# Patient Record
Sex: FEMALE | Race: WHITE | Employment: UNEMPLOYED | URBAN - METROPOLITAN AREA
[De-identification: names, ages, dates, MRNs, and addresses within clinical notes are randomized per-mention and may not be internally consistent; named-entity substitution may affect disease eponyms.]

---

## 2022-09-29 ENCOUNTER — OFFICE VISIT (OUTPATIENT)
Dept: ORTHOPEDIC SURGERY | Age: 67
End: 2022-09-29
Payer: MEDICARE

## 2022-09-29 VITALS
OXYGEN SATURATION: 96 % | WEIGHT: 187 LBS | SYSTOLIC BLOOD PRESSURE: 117 MMHG | TEMPERATURE: 97.2 F | DIASTOLIC BLOOD PRESSURE: 63 MMHG | HEART RATE: 94 BPM

## 2022-09-29 DIAGNOSIS — M43.16 SPONDYLOLISTHESIS AT L4-L5 LEVEL: ICD-10-CM

## 2022-09-29 DIAGNOSIS — M84.48XA SACRAL INSUFFICIENCY FRACTURE, INITIAL ENCOUNTER: Primary | ICD-10-CM

## 2022-09-29 DIAGNOSIS — M43.16 SPONDYLOLISTHESIS AT L4-L5 LEVEL: Primary | ICD-10-CM

## 2022-09-29 DIAGNOSIS — G95.19 NEUROGENIC CLAUDICATION (HCC): ICD-10-CM

## 2022-09-29 PROCEDURE — G8427 DOCREV CUR MEDS BY ELIG CLIN: HCPCS | Performed by: PHYSICAL MEDICINE & REHABILITATION

## 2022-09-29 PROCEDURE — G8432 DEP SCR NOT DOC, RNG: HCPCS | Performed by: PHYSICAL MEDICINE & REHABILITATION

## 2022-09-29 PROCEDURE — 96372 THER/PROPH/DIAG INJ SC/IM: CPT | Performed by: PHYSICAL MEDICINE & REHABILITATION

## 2022-09-29 PROCEDURE — 99204 OFFICE O/P NEW MOD 45 MIN: CPT | Performed by: PHYSICAL MEDICINE & REHABILITATION

## 2022-09-29 PROCEDURE — 1101F PT FALLS ASSESS-DOCD LE1/YR: CPT | Performed by: PHYSICAL MEDICINE & REHABILITATION

## 2022-09-29 PROCEDURE — 1123F ACP DISCUSS/DSCN MKR DOCD: CPT | Performed by: PHYSICAL MEDICINE & REHABILITATION

## 2022-09-29 PROCEDURE — G8421 BMI NOT CALCULATED: HCPCS | Performed by: PHYSICAL MEDICINE & REHABILITATION

## 2022-09-29 PROCEDURE — 72110 X-RAY EXAM L-2 SPINE 4/>VWS: CPT | Performed by: PHYSICAL MEDICINE & REHABILITATION

## 2022-09-29 PROCEDURE — G8400 PT W/DXA NO RESULTS DOC: HCPCS | Performed by: PHYSICAL MEDICINE & REHABILITATION

## 2022-09-29 PROCEDURE — 1090F PRES/ABSN URINE INCON ASSESS: CPT | Performed by: PHYSICAL MEDICINE & REHABILITATION

## 2022-09-29 PROCEDURE — 3017F COLORECTAL CA SCREEN DOC REV: CPT | Performed by: PHYSICAL MEDICINE & REHABILITATION

## 2022-09-29 PROCEDURE — G8536 NO DOC ELDER MAL SCRN: HCPCS | Performed by: PHYSICAL MEDICINE & REHABILITATION

## 2022-09-29 RX ORDER — KETOROLAC TROMETHAMINE 30 MG/ML
30 INJECTION, SOLUTION INTRAMUSCULAR; INTRAVENOUS ONCE
Status: COMPLETED | OUTPATIENT
Start: 2022-09-29 | End: 2022-09-29

## 2022-09-29 RX ORDER — TIZANIDINE 4 MG/1
4 TABLET ORAL
COMMUNITY
End: 2022-09-29

## 2022-09-29 RX ORDER — GABAPENTIN 300 MG/1
CAPSULE ORAL
Qty: 60 CAPSULE | Refills: 1 | Status: SHIPPED | OUTPATIENT
Start: 2022-09-29

## 2022-09-29 RX ORDER — UREA 10 %
100 LOTION (ML) TOPICAL DAILY
COMMUNITY

## 2022-09-29 RX ORDER — DICLOFENAC POTASSIUM 50 MG/1
50 TABLET, FILM COATED ORAL 2 TIMES DAILY
COMMUNITY
End: 2022-09-29

## 2022-09-29 RX ORDER — HYDROXYCHLOROQUINE SULFATE 200 MG/1
200 TABLET, FILM COATED ORAL 2 TIMES DAILY
COMMUNITY

## 2022-09-29 RX ORDER — ACETAMINOPHEN 500 MG
TABLET ORAL
COMMUNITY

## 2022-09-29 RX ORDER — NAPROXEN 500 MG/1
500 TABLET ORAL
Qty: 60 TABLET | Refills: 0 | Status: SHIPPED | OUTPATIENT
Start: 2022-09-29

## 2022-09-29 RX ORDER — SPIRONOLACTONE 25 MG
TABLET ORAL
COMMUNITY

## 2022-09-29 RX ADMIN — KETOROLAC TROMETHAMINE 30 MG: 30 INJECTION, SOLUTION INTRAMUSCULAR; INTRAVENOUS at 12:14

## 2022-09-29 NOTE — PATIENT INSTRUCTIONS
Spondylolysis and Spondylolisthesis: Exercises  Introduction  Here are some examples of exercises for you to try. The exercises may be suggested for a condition or for rehabilitation. Start each exercise slowly. Ease off the exercises if you start to have pain. You will be told when to start these exercises and which ones will work best for you. How to do the exercises  Single knee-to-chest    Lie on your back with your knees bent and your feet flat on the floor. You can put a small pillow under your head and neck if it is more comfortable. Bring one knee to your chest, keeping the other foot flat on the floor. Keep your lower back pressed to the floor. Hold for 15 to 30 seconds. Relax, and lower the knee to the starting position. Repeat with the other leg. Repeat 2 to 4 times with each leg. To get more stretch, put your other leg flat on the floor while pulling your knee to your chest.  Double knee-to-chest    Lie on your back with your knees bent and your feet flat on the floor. You can put a small pillow under your head and neck if it is more comfortable. Bring both knees to your chest.  Keep your lower back pressed to the floor. Hold for 15 to 30 seconds. Relax, and lower your knees to the starting position. Repeat 2 to 4 times. Alternate arm and leg (bird dog) exercise    Do this exercise slowly. Try to keep your body straight at all times. Start on the floor, on your hands and knees. Tighten your belly muscles by pulling your belly button in toward your spine. Be sure you continue to breathe normally and do not hold your breath. Raise one arm off the floor, and hold it straight out in front of you. Be careful not to let your shoulder drop down, because that will twist your trunk. Hold for about 6 seconds, then lower your arm and switch to your other arm. Repeat 8 to 12 times on each arm. When you can do this exercise with ease and no pain, repeat steps 1 through 5.  But this time do it with one leg raised off the floor, holding your leg straight out behind you. Be careful not to let your hip drop down, because that will twist your trunk. When holding your leg straight out becomes easier, try raising your opposite arm at the same time, and repeat steps 1 through 5. Bridging    Lie on your back with both knees bent. Your knees should be bent about 90 degrees. Then push your feet into the floor, squeeze your buttocks, and lift your hips off the floor until your shoulders, hips, and knees are all in a straight line. Hold for about 6 seconds as you continue to breathe normally, and then slowly lower your hips back down to the floor and rest for up to 10 seconds. Repeat 8 to 12 times. Curl-ups    Lie on the floor on your back with your knees bent at a 90-degree angle. Your feet should be flat on the floor, about 12 inches from your buttocks. Cross your arms over your chest. If this bothers your neck, try putting your hands behind your neck (not your head), with your elbows spread apart. Slowly tighten your belly muscles and raise your shoulder blades off the floor. Keep your head in line with your body, and do not press your chin to your chest.  Hold this position for 1 or 2 seconds, then slowly lower yourself back down to the floor. Repeat 8 to 12 times. Plank    Do this exercise slowly. Try to keep your body straight at all times, and do not let one hip drop lower than the other. Lie on your stomach, resting your upper body on your forearms. Tighten your belly muscles by pulling your belly button in toward your spine. Keeping your knees on the floor, press down with your forearms to lift your upper body off the floor. Hold for about 6 seconds, then lower your body to the floor. Rest for up to 10 seconds. Repeat 8 to 12 times. Over time, work up to holding for 15 to 30 seconds each time.   If this exercise is easy to do with your knees on the floor, try doing this exercise with your knees and legs straight, supported by your toes on the floor. Follow-up care is a key part of your treatment and safety. Be sure to make and go to all appointments, and call your doctor if you are having problems. It's also a good idea to know your test results and keep a list of the medicines you take. Where can you learn more? Go to http://www.walker.com/  Enter M245 in the search box to learn more about \"Spondylolysis and Spondylolisthesis: Exercises. \"  Current as of: July 1, 2021               Content Version: 13.2  © 0892-7261 [employer]. Care instructions adapted under license by [employer] (which disclaims liability or warranty for this information). If you have questions about a medical condition or this instruction, always ask your healthcare professional. [address] any warranty or liability for your use of this information.

## 2022-09-29 NOTE — PROGRESS NOTES
[address]  [address], [address]  [place], [address] [place] [address] Street  Phone: [phone]  Fax: [phone] [address] [place] [address]  : 1955  PCP: None    NEW PATIENT EVALUATION      ASSESSMENT AND PLAN    Diagnoses and all orders for this visit:    1. Spondylolisthesis at L4-L5 level  -     AMB POC XRAY, SPINE, LUMBOSACRAL; 4+  -     gabapentin (NEURONTIN) 300 mg capsule; Month#1: Take one po qhs x1 week, then increase to one po bid thereafter  -     naproxen (NAPROSYN) 500 mg tablet; Take 1 Tablet by mouth two (2) times daily as needed for Pain.  -     MRI LUMB SPINE WO CONT; Future  -     ketorolac tromethamine (TORADOL) 60 mg/2 mL injection 30 mg    2. Neurogenic claudication (HCC)  -     gabapentin (NEURONTIN) 300 mg capsule; Month#1: Take one po qhs x1 week, then increase to one po bid thereafter  -     naproxen (NAPROSYN) 500 mg tablet; Take 1 Tablet by mouth two (2) times daily as needed for Pain.  -     MRI LUMB SPINE WO CONT; Future       Suzie Colon is a 77 y.o. female, normally lives in [place], with a history of rheumatoid arthritis, presenting with subacute onset of progressive low back pain radiating into her bilateral lower extremities. She is unable to stand or walk. Listhesis noted on imaging today. She has failed corticosteroids, anti-inflammatories, and muscle relaxers. I suspect lumbar stenosis. DC Diclofenac  DC Zanaflex  Trial of Gabapentin 300 mg. Take 1 po QHS x one week then increase to 1 po BID thereafter  Trial of Naproxen 500 mg BID PRN. Patient has been on this in the past for her RA. Discussed side effects of long-term NSAID use including GI, renal, cardiovascular. Toradol 30 mg injection IM x 1 now  L MRI for one month new onset back pain with claudication, failed corticosteroids/NSAIDs/muscle relaxer, grade 1-2 listhesis noted on XRAY, hx of RA  Avoid lifting objects greater than 20 pounds.   Given home exercise for listhesis, perform daily as tolerated. Administrations This Visit       ketorolac tromethamine (TORADOL) 60 mg/2 mL injection 30 mg       Admin Date  09/29/2022  12:14 Action  Given Dose  30 mg Route  IntraMUSCular Site  Left Gluteus Cezar Administered By  Cait Mcclain LPN    NDC: 22382-806-46    Patient Supplied?: No                      Follow-up and Dispositions    Return in about 4 weeks (around 10/27/2022) for MRI/CT fu. HISTORY OF PRESENT ILLNESS  Azalia Rachel is seen today in consultation for back pain. Pt reports chronic, intermittent low back pain. Pt states her pain has been manageable since 2017. Her most recent episode occurred 9/1/2022. Her pain radiates into her groin and is increased with standing. She notes laying down and sitting relieves her pain. Pt notes paresthesias in her anterior calves. She was seen in the ED 9/2022 where she received Prednisone, Diclofenac, and Zanaflex. The Prednisone and Diclofenac were ineffective. She states Zanaflex takes the edge off. At night but does not help the pain. She has been miserable unable to stand and walk. Normally lives in [place], is visiting her family, son-in-law is [employer]. Denies persistent fevers, chills, weight changes, saddle paresthesias, and neurogenic bowel or bladder symptoms.     Pain Assessment  9/29/2022   Location of Pain Back   Location Modifiers Inferior   Severity of Pain 4   Quality of Pain Dull;Aching   Duration of Pain Persistent   Frequency of Pain Constant   Aggravating Factors (No Data)   Aggravating Factors Comment any pressure on back   Limiting Behavior Some   Relieving Factors Rest       Onset of pain: chronic, intermittent      Investigations:   L XR AP/lat/flex/ext 4V 9/29/2022 (I personally reviewed these images): grade 1-2 listhesis L4-5, no instability, degenerative changes L3 to sacrum  Spine surgery consult: none    Treatments:  Physical therapy: 2017 for rotator cuff and low back  Spinal injections: no  Spinal surgery- no  Beneficial medications: none  Failed medications: Diclofenac, Prednisone, Tylenol, Zanaflex - takes edge off    Work Status: retired  Pertinent PMHx:  RA (diagnosed 2010), osteopenia. Pt is from [place], currently visiting family indefinitely. Visit Vitals  /63 (BP 1 Location: Right upper arm, BP Patient Position: Sitting, BP Cuff Size: Adult)   Pulse 94   Temp 97.2 °F (36.2 °C) (Temporal)   Wt 187 lb (84.8 kg)   SpO2 96%       PHYSICAL EXAM  Ambulates with single point cane, limping. TTP midline sacrum  LE strength intact  SLR negative  Tender B/L trochanteric bursa  DTRs 2+ patella  No edema      Past Medical History:   Diagnosis Date    Osteopenia     Rheumatoid arthritis ([facility] [address]         History reviewed. No pertinent surgical history.

## 2022-09-29 NOTE — PROGRESS NOTES
Consent was explained to the pt and signed. No questions or concerns voiced at this time. Pt given 30mg/1ml of toradol IM in left gluteus. No sign or symptoms of infection noted at injection site. There was no bleeding, swelling or leaking noted after injection. Pt handed injection information sheet to take home. Ms. Madhu Mahmood tolerated the injection well and did not want to wait in the exam room for observation. She ambulated to check out with out any issues.

## 2022-10-14 ENCOUNTER — HOSPITAL ENCOUNTER (OUTPATIENT)
Dept: MRI IMAGING | Age: 67
Discharge: HOME | End: 2022-10-14
Payer: MEDICARE

## 2022-10-14 PROCEDURE — 72148 MRI LUMBAR SPINE W/O DYE: CPT

## 2022-10-18 ENCOUNTER — TELEPHONE (OUTPATIENT)
Dept: ORTHOPEDIC SURGERY | Age: 67
End: 2022-10-18

## 2022-10-18 DIAGNOSIS — M84.48XA SACRAL INSUFFICIENCY FRACTURE, INITIAL ENCOUNTER: Primary | ICD-10-CM

## 2022-10-18 RX ORDER — CALCITONIN SALMON 200 [IU]/.09ML
1 SPRAY, METERED NASAL DAILY
Qty: 1 EACH | Refills: 1 | Status: SHIPPED | OUTPATIENT
Start: 2022-10-18

## 2022-10-18 NOTE — PROGRESS NOTES
Spoke to patient regarding MRI findings of insufficiency fx, osteoporosis (Rheum rec Prolia in past), IR consult, meds.   Plan:  D/c gabapentin, ineffective  Start Micalcin spray-RX e-scribed  Cont RTC Naprosyn/APAP and 1200mg Ca w/vit D  Discussed T#3 if ongoing pain  Await IR consult  Will re-eval post sacroplasty

## 2022-10-21 ENCOUNTER — TELEPHONE (OUTPATIENT)
Dept: GENERAL RADIOLOGY | Age: 67
End: 2022-10-21

## 2022-10-21 DIAGNOSIS — M84.48XG BILATERAL SACRAL INSUFFICIENCY FRACTURE WITH DELAYED HEALING: Primary | ICD-10-CM

## 2022-10-21 DIAGNOSIS — M80.08XA AGE-RELATED OSTEOPOROSIS WITH CURRENT PATHOLOGICAL FRACTURE, VERTEBRA(E), INITIAL ENCOUNTER FOR FRACTURE (HCC): ICD-10-CM

## 2022-10-21 NOTE — TELEPHONE ENCOUNTER
Interventional Radiology Clinic Consultation Note    Patient: Yenifer Zapata               Sex: female          Date of Visit: 10/21/22       YOB: 1955      Age:  79 y.o. Referring Physician: Dr. Russel Sampson          HPI:     Yenifer Zapata is a 79 y.o. female with a past medical history of osteopenia, rheumatoid arthritis, and chronic low back pain with new acute low back pain x 1 month who has been seen in evaluation of bilateral sacral insufficiency fractures at the request of Dr. Russel Sampson. The patient began experiencing back pain after moving boxes 09/2022. She continues to have severe low back pain despite muscle relaxants, steroids, and gabapentin. MRI was ordered to further evaluate. MRI obtained 10/14/22 demonstrates bilateral sacral insufficiency fractures. The patient denies cancer history. Pain is ranked 10/10 aggravated by standing and walking with pain radiating from the low back to her right groin causing right leg paresthesias and relieved to 0/10 with lying down and sitting. The patient denies change in bowel or bladder movements, other paresthesias, headache, dizziness, dyspnea, or recent fever. Past Medical History:   Diagnosis Date    Osteopenia     Rheumatoid arthritis ([facility] [address]      No past surgical history on file. Family History   Problem Relation Age of Onset    Cancer Mother     COPD Mother     Heart Disease Brother      Prior to Admission medications    Medication Sig Start Date End Date Taking? Authorizing Provider   calcitonin, salmon, (MIACALCIN) nasal 1 [place] by IntraNASal route daily. Alternate nostrils daily 10/18/22   Riley Olguin MD   hydrOXYchloroQUINE (PLAQUENIL) 200 mg tablet Take 200 mg by mouth two (2) times a day. Provider, Historical   cyanocobalamin (Vitamin B-12) 100 mcg tablet Take 100 mcg by mouth daily.     Provider, Historical   Calcium-Cholecalciferol, D3, (Calcium 600 with Vitamin D3) 600 mg-10 mcg (400 unit) chew Take  by mouth. Provider, Historical   acetaminophen (Tylenol Extra Strength) 500 mg tablet Take  by mouth every six (6) hours as needed for Pain. Provider, Historical   gabapentin (NEURONTIN) 300 mg capsule Month#1: Take one po qhs x1 week, then increase to one po bid thereafter 9/29/22   Delfina Lisa MD   naproxen (NAPROSYN) 500 mg tablet Take 1 Tablet by mouth two (2) times daily as needed for Pain. 9/29/22   Delfina Lisa MD     Allergies   Allergen Reactions    Pcn [Penicillins] Rash and Swelling     Review of Systems  Pertinent items are noted in the History of Present Illness. Physical Exam:   The physical exam was deferred    Assessment   Back pain  Acute compression fracture    [place] is a 79 y.o. female with a history of osteopenia with new onset bilateral sacral insufficiency fractures and associated severe back pain negatively impacting activities of daily life and resistant to conservative management. The sacroplasty procedure was discussed in detail including risks of injury (such as to the spinal nerve, cord, or adjacent structures), infection, and bleeding. The patient voices understanding. All questions were answered and concerns addressed. Plan   Case and images reviewed by Dr. Whitney Larry. The plan is as follows:    Given that the patient is in significant discomfort despite conservative management, we will plan for image-guided bilateral sacroplasty with anesthesia as IR and Anesthesia schedules allow. I have spent 45 minutes with the patient with greater than 50% of the time dedicated to the patient's counseling as well as the coordination of patient care.     Thank you,  ANTON Cordova

## 2022-10-28 ENCOUNTER — HOSPITAL ENCOUNTER (OUTPATIENT)
Dept: CT IMAGING | Age: 67
Discharge: HOME | End: 2022-10-28
Attending: PHYSICIAN ASSISTANT | Admitting: RADIOLOGY
Payer: MEDICARE

## 2022-10-28 VITALS
DIASTOLIC BLOOD PRESSURE: 61 MMHG | OXYGEN SATURATION: 95 % | WEIGHT: 186.2 LBS | HEART RATE: 67 BPM | BODY MASS INDEX: 31.02 KG/M2 | RESPIRATION RATE: 15 BRPM | SYSTOLIC BLOOD PRESSURE: 123 MMHG | HEIGHT: 65 IN

## 2022-10-28 DIAGNOSIS — M84.48XG BILATERAL SACRAL INSUFFICIENCY FRACTURE WITH DELAYED HEALING: ICD-10-CM

## 2022-10-28 DIAGNOSIS — M80.08XA AGE-RELATED OSTEOPOROSIS WITH CURRENT PATHOLOGICAL FRACTURE, VERTEBRA(E), INITIAL ENCOUNTER FOR FRACTURE (HCC): ICD-10-CM

## 2022-10-28 LAB
ANION GAP SERPL CALC-SCNC: 4 MMOL/L (ref 3–18)
APTT PPP: 24.8 SEC (ref 23–36.4)
BUN SERPL-MCNC: 15 MG/DL (ref 7–18)
BUN/CREAT SERPL: 19 (ref 12–20)
CALCIUM SERPL-MCNC: 8.8 MG/DL (ref 8.5–10.1)
CHLORIDE SERPL-SCNC: 112 MMOL/L (ref 100–111)
CO2 SERPL-SCNC: 26 MMOL/L (ref 21–32)
CREAT SERPL-MCNC: 0.8 MG/DL (ref 0.6–1.3)
ERYTHROCYTE [DISTWIDTH] IN BLOOD BY AUTOMATED COUNT: 13.9 % (ref 11.6–14.5)
GLUCOSE SERPL-MCNC: 86 MG/DL (ref 74–99)
HCT VFR BLD AUTO: 41.7 % (ref 35–45)
HGB BLD-MCNC: 13.5 G/DL (ref 12–16)
INR PPP: 1 (ref 0.8–1.2)
MCH RBC QN AUTO: 27.4 PG (ref 24–34)
MCHC RBC AUTO-ENTMCNC: 32.4 G/DL (ref 31–37)
MCV RBC AUTO: 84.6 FL (ref 78–100)
NRBC # BLD: 0 K/UL (ref 0–0.01)
NRBC BLD-RTO: 0 PER 100 WBC
PLATELET # BLD AUTO: 249 K/UL (ref 135–420)
PMV BLD AUTO: 10.7 FL (ref 9.2–11.8)
POTASSIUM SERPL-SCNC: 3.7 MMOL/L (ref 3.5–5.5)
PROTHROMBIN TIME: 13.7 SEC (ref 11.5–15.2)
RBC # BLD AUTO: 4.93 M/UL (ref 4.2–5.3)
SODIUM SERPL-SCNC: 142 MMOL/L (ref 136–145)
WBC # BLD AUTO: 8.3 K/UL (ref 4.6–13.2)

## 2022-10-28 PROCEDURE — 77030020063 CT SACROPLASTY BI

## 2022-10-28 PROCEDURE — 85730 THROMBOPLASTIN TIME PARTIAL: CPT

## 2022-10-28 PROCEDURE — 85610 PROTHROMBIN TIME: CPT

## 2022-10-28 PROCEDURE — 74011250636 HC RX REV CODE- 250/636: Performed by: RADIOLOGY

## 2022-10-28 PROCEDURE — 74011000250 HC RX REV CODE- 250: Performed by: RADIOLOGY

## 2022-10-28 PROCEDURE — 85027 COMPLETE CBC AUTOMATED: CPT

## 2022-10-28 PROCEDURE — 80048 BASIC METABOLIC PNL TOTAL CA: CPT

## 2022-10-28 PROCEDURE — 74011250636 HC RX REV CODE- 250/636: Performed by: STUDENT IN AN ORGANIZED HEALTH CARE EDUCATION/TRAINING PROGRAM

## 2022-10-28 RX ORDER — SODIUM CHLORIDE 9 MG/ML
75 INJECTION, SOLUTION INTRAVENOUS CONTINUOUS
Status: DISCONTINUED | OUTPATIENT
Start: 2022-10-28 | End: 2022-10-28 | Stop reason: HOSPADM

## 2022-10-28 RX ORDER — HYDROMORPHONE HYDROCHLORIDE 2 MG/ML
1 INJECTION, SOLUTION INTRAMUSCULAR; INTRAVENOUS; SUBCUTANEOUS
Status: COMPLETED | OUTPATIENT
Start: 2022-10-28 | End: 2022-10-28

## 2022-10-28 RX ORDER — NALOXONE HYDROCHLORIDE 0.4 MG/ML
0.2 INJECTION, SOLUTION INTRAMUSCULAR; INTRAVENOUS; SUBCUTANEOUS AS NEEDED
Status: DISCONTINUED | OUTPATIENT
Start: 2022-10-28 | End: 2022-10-28 | Stop reason: HOSPADM

## 2022-10-28 RX ORDER — SODIUM CHLORIDE 0.9 % (FLUSH) 0.9 %
5-40 SYRINGE (ML) INJECTION EVERY 8 HOURS
Status: DISCONTINUED | OUTPATIENT
Start: 2022-10-28 | End: 2022-10-28 | Stop reason: HOSPADM

## 2022-10-28 RX ORDER — SODIUM CHLORIDE 0.9 % (FLUSH) 0.9 %
5-40 SYRINGE (ML) INJECTION AS NEEDED
Status: DISCONTINUED | OUTPATIENT
Start: 2022-10-28 | End: 2022-10-28 | Stop reason: HOSPADM

## 2022-10-28 RX ORDER — MIDAZOLAM HYDROCHLORIDE 1 MG/ML
.5-2 INJECTION, SOLUTION INTRAMUSCULAR; INTRAVENOUS
Status: DISPENSED | OUTPATIENT
Start: 2022-10-28 | End: 2022-10-28

## 2022-10-28 RX ORDER — SODIUM CHLORIDE 9 MG/ML
20 INJECTION, SOLUTION INTRAVENOUS CONTINUOUS
Status: DISCONTINUED | OUTPATIENT
Start: 2022-10-28 | End: 2022-10-28 | Stop reason: HOSPADM

## 2022-10-28 RX ORDER — LIDOCAINE HYDROCHLORIDE 10 MG/ML
INJECTION, SOLUTION EPIDURAL; INFILTRATION; INTRACAUDAL; PERINEURAL
Status: DISCONTINUED
Start: 2022-10-28 | End: 2022-10-28 | Stop reason: HOSPADM

## 2022-10-28 RX ORDER — FLUMAZENIL 0.1 MG/ML
0.2 INJECTION INTRAVENOUS AS NEEDED
Status: DISCONTINUED | OUTPATIENT
Start: 2022-10-28 | End: 2022-10-28 | Stop reason: HOSPADM

## 2022-10-28 RX ORDER — DIPHENHYDRAMINE HYDROCHLORIDE 50 MG/ML
25-50 INJECTION, SOLUTION INTRAMUSCULAR; INTRAVENOUS ONCE
Status: DISCONTINUED | OUTPATIENT
Start: 2022-10-28 | End: 2022-10-28 | Stop reason: HOSPADM

## 2022-10-28 RX ORDER — FENTANYL CITRATE 50 UG/ML
12.5-1 INJECTION, SOLUTION INTRAMUSCULAR; INTRAVENOUS
Status: DISPENSED | OUTPATIENT
Start: 2022-10-28 | End: 2022-10-28

## 2022-10-28 RX ADMIN — HYDROMORPHONE HYDROCHLORIDE 1 MG: 2 INJECTION, SOLUTION INTRAMUSCULAR; INTRAVENOUS; SUBCUTANEOUS at 11:16

## 2022-10-28 RX ADMIN — FENTANYL CITRATE 25 MCG: 50 INJECTION, SOLUTION INTRAMUSCULAR; INTRAVENOUS at 12:55

## 2022-10-28 RX ADMIN — FENTANYL CITRATE 25 MCG: 50 INJECTION, SOLUTION INTRAMUSCULAR; INTRAVENOUS at 12:45

## 2022-10-28 RX ADMIN — VANCOMYCIN HYDROCHLORIDE 1000 MG: 1 INJECTION, POWDER, LYOPHILIZED, FOR SOLUTION INTRAVENOUS at 12:00

## 2022-10-28 RX ADMIN — MIDAZOLAM 0.5 MG: 1 INJECTION INTRAMUSCULAR; INTRAVENOUS at 12:10

## 2022-10-28 RX ADMIN — MIDAZOLAM 0.5 MG: 1 INJECTION INTRAMUSCULAR; INTRAVENOUS at 12:20

## 2022-10-28 RX ADMIN — SODIUM CHLORIDE, PRESERVATIVE FREE 10 ML: 5 INJECTION INTRAVENOUS at 14:00

## 2022-10-28 RX ADMIN — SODIUM CHLORIDE 75 ML/HR: 9 INJECTION, SOLUTION INTRAVENOUS at 14:25

## 2022-10-28 RX ADMIN — FENTANYL CITRATE 25 MCG: 50 INJECTION, SOLUTION INTRAMUSCULAR; INTRAVENOUS at 13:00

## 2022-10-28 RX ADMIN — SODIUM CHLORIDE 20 ML/HR: 9 INJECTION, SOLUTION INTRAVENOUS at 11:23

## 2022-10-28 RX ADMIN — MIDAZOLAM 1 MG: 1 INJECTION INTRAMUSCULAR; INTRAVENOUS at 12:00

## 2022-10-28 RX ADMIN — FENTANYL CITRATE 25 MCG: 50 INJECTION, SOLUTION INTRAMUSCULAR; INTRAVENOUS at 12:40

## 2022-10-28 NOTE — H&P
History and Physical    Patient: [place]           Sex: female       DOA: 10/28/2022  YOB: 1955      Age:  79 y.o.     LOS:  LOS: 0 days        HPI:     [place] is a 79 y.o. female with a history of osteopenia with new onset bilateral sacral insufficiency fractures and associated severe back pain who presents for bilateral sacroplasty. Past Medical History:   Diagnosis Date    Osteopenia     Rheumatoid arthritis ([address]        No past surgical history on file. Family History   Problem Relation Age of Onset    Cancer Mother     COPD Mother     Heart Disease Brother        Social History     Socioeconomic History    Marital status: SINGLE   Tobacco Use    Smoking status: Never    Smokeless tobacco: Never   Vaping Use    Vaping Use: Never used   Substance and Sexual Activity    Alcohol use: Yes     Comment: rarely    Drug use: Never    Sexual activity: Not Currently       Prior to Admission medications    Medication Sig Start Date End Date Taking? Authorizing Provider   calcitonin, salmon, (MIACALCIN) nasal 1 [place] by IntraNASal route daily. Alternate nostrils daily 10/18/22   Tasia Reyes MD   hydrOXYchloroQUINE (PLAQUENIL) 200 mg tablet Take 200 mg by mouth two (2) times a day. Provider, Historical   cyanocobalamin (Vitamin B-12) 100 mcg tablet Take 100 mcg by mouth daily. Provider, Historical   Calcium-Cholecalciferol, D3, (Calcium 600 with Vitamin D3) 600 mg-10 mcg (400 unit) chew Take  by mouth. Provider, Historical   acetaminophen (Tylenol Extra Strength) 500 mg tablet Take  by mouth every six (6) hours as needed for Pain. Provider, Historical   gabapentin (NEURONTIN) 300 mg capsule Month#1: Take one po qhs x1 week, then increase to one po bid thereafter 9/29/22   Tasia Reyes MD   naproxen (NAPROSYN) 500 mg tablet Take 1 Tablet by mouth two (2) times daily as needed for Pain.  9/29/22   Tasia Reyes MD       Allergies   Allergen Reactions    Pcn [Penicillins] Rash and Swelling       Physical Exam:      There were no vitals taken for this visit. Physical Exam:  Mallampati 2 ASA 2  General: A&O x 4, NAD  Heart: RRR  Lungs:Normal work of breathing on room air    Labs Reviewed: All lab results for the last 24 hours reviewed.     Assessment/Plan     The patient is an appropriate candidate to undergo the planned procedure and sedation    ANTON George

## 2022-10-28 NOTE — PROGRESS NOTES
Cath holding summary     Patient escorted to cath holding from waiting area ambulatory, alert and oriented x 4, voicing no complaints. Changed into gown and placed on monitor. NPO since MN. Lab results, med rec and H&P reviewed on chart. PIV x 1 inserted without difficulty. 1130  Patient left unit with transport for procedure. 1319  TRANSFER - IN REPORT:    Verbal report received from Kaitlin(name) on [address]  being received from CT(unit) for routine post - op      Report consisted of patients Situation, Background, Assessment and   Recommendations(SBAR). Information from the following report(s) SBAR, Procedure Summary, Intake/Output, MAR, Accordion, Recent Results, Med Rec Status, Procedure Verification, Quality Measures, and Dual Neuro Assessment was reviewed with the receiving nurse. Opportunity for questions and clarification was provided. Assessment completed upon patients arrival to unit and care assumed.

## 2022-10-28 NOTE — DISCHARGE INSTRUCTIONS
Kyphoplasty: What to Expect at [facility]  After kyphoplasty to relieve pain from compression fractures, your back may feel sore where the hollow needle (trocar) went into your back. This should go away in a few days. Most people are able to return to their daily activities within a day. This care sheet gives you a general idea about how long it will take for you to recover. But each person recovers at a different pace. Follow the steps below to get better as quickly as possible. How can you care for yourself at home? Activity    Take it easy for the first 24 hours. Rest when you feel tired. Getting enough sleep will help you recover. For the first day after the procedure, avoid lifting anything that would make you strain. This may include heavy grocery bags and milk containers, a heavy briefcase or backpack, cat litter or dog food bags, a vacuum , or a child. Diet    You can eat your normal diet. If your stomach is upset, try bland, low-fat foods like plain rice, broiled chicken, toast, and yogurt. Medicines    Your doctor will tell you if and when you can restart your medicines. You will also get instructions about taking any new medicines. If you stopped taking aspirin or some other blood thinner, your doctor will tell you when to start taking it again. Be safe with medicines. Take pain medicines exactly as directed. If the doctor gave you a prescription medicine for pain, take it as prescribed. If you are not taking a prescription pain medicine, ask your doctor if you can take an over-the-counter medicine. Do not take two or more pain medicines at the same time unless your doctor told you to. Many pain medicines have acetaminophen, which is Tylenol. Too much acetaminophen (Tylenol) can be harmful. Incision care    You will have a dressing over the cut (incision). A dressing helps the incision heal and protects it. Your doctor will tell you how to take care of this.    Ice If you are sore where the needle was inserted, put ice or a cold pack on your back for 10 to 20 minutes at a time. Try to do this every 1 to 2 hours for the next 3 days (when you are awake) or until the swelling goes down. Put a thin cloth between the ice and your skin. Follow-up care is a key part of your treatment and safety. Be sure to make and go to all appointments, and call your doctor if you are having problems. It's also a good idea to know your test results and keep a list of the medicines you take. When should you call for help? Call 911 anytime you think you may need emergency care. For example, call if:    You passed out (lost consciousness). You have severe trouble breathing. You have sudden chest pain and shortness of breath, or you cough up blood. You are unable to move a leg at all. Call your doctor now or seek immediate medical care if:    You have new or worse symptoms in your legs, belly, or buttocks. Symptoms may include:  Numbness or tingling. Weakness. Pain. You lose bladder or bowel control. You have signs of infection, such as: Increased pain, swelling, warmth, or redness. Red streaks leading from the incision. Pus draining from the incision. A fever. Watch closely for any changes in your health, and be sure to contact your doctor if:    You do not get better as expected. Where can you learn more? Go to http://www.gray.com/  Enter O461 in the search box to learn more about \"Kyphoplasty: What to Expect at Home. \"  Current as of: March 9, 2022               Content Version: 13.4  © 0903-2000 [employer]. Care instructions adapted under license by [employer] (which disclaims liability or warranty for this information).  If you have questions about a medical condition or this instruction, always ask your healthcare professional. [address] any warranty or liability for your use of this information. DISCHARGE SUMMARY from Nurse    PATIENT INSTRUCTIONS:    After general anesthesia or intravenous sedation, for 24 hours or while taking prescription Narcotics:  Limit your activities  Do not drive and operate hazardous machinery  Do not make important personal or business decisions  Do  not drink alcoholic beverages  If you have not urinated within 8 hours after discharge, please contact your surgeon on call. Report the following to your surgeon:  Excessive pain, swelling, redness or odor of or around the surgical area  Temperature over 100.5  Nausea and vomiting lasting longer than 4 hours or if unable to take medications  Any signs of decreased circulation or nerve impairment to extremity: change in color, persistent  numbness, tingling, coldness or increase pain  Any questions    What to do at Home:  Recommended activity: Activity as tolerated and no driving for today. *  Please give a list of your current medications to your Primary Care Provider. *  Please update this list whenever your medications are discontinued, doses are      changed, or new medications (including over-the-counter products) are added. *  Please carry medication information at all times in case of emergency situations. These are general instructions for a healthy lifestyle:    No smoking/ No tobacco products/ Avoid exposure to second hand smoke  Surgeon General's Warning:  Quitting smoking now greatly reduces serious risk to your health. Obesity, smoking, and sedentary lifestyle greatly increases your risk for illness    A healthy diet, regular physical exercise & weight monitoring are important for maintaining a healthy lifestyle    You may be retaining fluid if you have a history of heart failure or if you experience any of the following symptoms:  Weight gain of 3 pounds or more overnight or 5 pounds in a week, increased swelling in our hands or feet or shortness of breath while lying flat in bed. Please call your doctor as soon as you notice any of these symptoms; do not wait until your next office visit. The discharge information has been reviewed with the patient. The patient verbalized understanding. Discharge medications reviewed with the patient and appropriate educational materials and side effects teaching were provided.   ___________________________________________________________________________________________________________________________________

## 2022-10-28 NOTE — PROCEDURES
RADIOLOGY POST PROCEDURE NOTE     October 28, 2022       1:00 PM     Preoperative Diagnosis:   Bilateral symptomatic sacral insufficiency fractures. Postoperative Diagnosis:  Same. :  Dr. Rika Jamison    Assistant:  None. Type of Anesthesia: 1% local lidocaine and IV moderate sedation with Versed and fentanyl. Procedure/Description: Image guided bilateral sacroplasty. Findings:   No bleeding. .    Estimated blood Loss: Minimal    Specimen Removed:   No    Blood transfusions:  None. Implants:  None. Complications: None    Condition: Stable    Discharge Plan: Discharge home if stable and no bleeding. Resume blood thinners if any in 24 hours.     Gladis Sullivan MD

## 2022-11-16 DIAGNOSIS — G95.19 NEUROGENIC CLAUDICATION (HCC): ICD-10-CM

## 2022-11-16 DIAGNOSIS — M43.16 SPONDYLOLISTHESIS AT L4-L5 LEVEL: ICD-10-CM

## 2022-11-16 RX ORDER — NAPROXEN 500 MG/1
500 TABLET ORAL
Qty: 60 TABLET | Refills: 0 | Status: SHIPPED | OUTPATIENT
Start: 2022-11-16 | End: 2022-11-28 | Stop reason: SDUPTHER

## 2022-11-28 ENCOUNTER — OFFICE VISIT (OUTPATIENT)
Dept: ORTHOPEDIC SURGERY | Age: 67
End: 2022-11-28
Payer: MEDICARE

## 2022-11-28 VITALS
DIASTOLIC BLOOD PRESSURE: 77 MMHG | SYSTOLIC BLOOD PRESSURE: 123 MMHG | OXYGEN SATURATION: 96 % | WEIGHT: 184.2 LBS | TEMPERATURE: 97.8 F | RESPIRATION RATE: 18 BRPM | BODY MASS INDEX: 29.6 KG/M2 | HEART RATE: 95 BPM | HEIGHT: 66 IN

## 2022-11-28 DIAGNOSIS — M84.48XS SACRAL INSUFFICIENCY FRACTURE, SEQUELA: Primary | ICD-10-CM

## 2022-11-28 DIAGNOSIS — M48.061 LUMBAR STENOSIS WITHOUT NEUROGENIC CLAUDICATION: ICD-10-CM

## 2022-11-28 DIAGNOSIS — R10.31 RIGHT GROIN PAIN: ICD-10-CM

## 2022-11-28 PROCEDURE — 99214 OFFICE O/P EST MOD 30 MIN: CPT | Performed by: PHYSICAL MEDICINE & REHABILITATION

## 2022-11-28 PROCEDURE — 3017F COLORECTAL CA SCREEN DOC REV: CPT | Performed by: PHYSICAL MEDICINE & REHABILITATION

## 2022-11-28 PROCEDURE — 1090F PRES/ABSN URINE INCON ASSESS: CPT | Performed by: PHYSICAL MEDICINE & REHABILITATION

## 2022-11-28 PROCEDURE — 1123F ACP DISCUSS/DSCN MKR DOCD: CPT | Performed by: PHYSICAL MEDICINE & REHABILITATION

## 2022-11-28 PROCEDURE — G8427 DOCREV CUR MEDS BY ELIG CLIN: HCPCS | Performed by: PHYSICAL MEDICINE & REHABILITATION

## 2022-11-28 PROCEDURE — G8432 DEP SCR NOT DOC, RNG: HCPCS | Performed by: PHYSICAL MEDICINE & REHABILITATION

## 2022-11-28 PROCEDURE — G8419 CALC BMI OUT NRM PARAM NOF/U: HCPCS | Performed by: PHYSICAL MEDICINE & REHABILITATION

## 2022-11-28 PROCEDURE — G8400 PT W/DXA NO RESULTS DOC: HCPCS | Performed by: PHYSICAL MEDICINE & REHABILITATION

## 2022-11-28 PROCEDURE — G8536 NO DOC ELDER MAL SCRN: HCPCS | Performed by: PHYSICAL MEDICINE & REHABILITATION

## 2022-11-28 PROCEDURE — 1101F PT FALLS ASSESS-DOCD LE1/YR: CPT | Performed by: PHYSICAL MEDICINE & REHABILITATION

## 2022-11-28 RX ORDER — METHYLPREDNISOLONE 4 MG/1
TABLET ORAL
COMMUNITY
Start: 2022-09-20 | End: 2022-11-28

## 2022-11-28 RX ORDER — NAPROXEN 500 MG/1
500 TABLET ORAL
Qty: 60 TABLET | Refills: 1 | Status: SHIPPED | OUTPATIENT
Start: 2022-11-28

## 2022-11-28 RX ORDER — ESOMEPRAZOLE MAGNESIUM 40 MG/1
40 CAPSULE, DELAYED RELEASE ORAL DAILY
COMMUNITY
Start: 2022-09-20

## 2022-11-28 RX ORDER — DICLOFENAC SODIUM 50 MG/1
50 TABLET, DELAYED RELEASE ORAL 2 TIMES DAILY
COMMUNITY
Start: 2022-09-20 | End: 2022-11-28

## 2022-11-28 NOTE — PROGRESS NOTES
Randal Shamarkhia presents today for   Chief Complaint   Patient presents with    Back Pain       Is someone accompanying this pt? no    Is the patient using any DME equipment during OV? no    Depression Screening:  No flowsheet data found. Learning Assessment:  No flowsheet data found. Abuse Screening:  No flowsheet data found. Fall Risk  Fall Risk Assessment, last 12 mths 11/28/2022   Able to walk? Yes   Fall in past 12 months? 0   Do you feel unsteady? 0   Are you worried about falling 0       OPIOID RISK TOOL  No flowsheet data found. Coordination of Care:  1. Have you been to the ER, urgent care clinic since your last visit? no  Hospitalized since your last visit? no    2. Have you seen or consulted any other health care providers outside of the [address] [place] [address] since your last visit? no Include any pap smears or colon screening.  N o

## 2022-11-28 NOTE — PROGRESS NOTES
Renita [facility] [address], [address]  [place]  Phone: [phone]  Fax: [phone] [address] [place] [address]  : 1955  PCP: None    PROGRESS NOTE      ASSESSMENT AND PLAN    Diagnoses and all orders for this visit:    1. Sacral insufficiency fracture, sequela, s/p sacroplasty BL 10/2022  -     REFERRAL TO PHYSICAL THERAPY    2. Lumbar stenosis without neurogenic claudication  -     REFERRAL TO PHYSICAL THERAPY  -     naproxen (NAPROSYN) 500 mg tablet; Take 1 Tablet by mouth two (2) times daily as needed for Pain. 3. Right groin pain  -     REFERRAL TO PHYSICAL THERAPY      [place] is a 79 y.o. female w/RA, resides in [address] [place] [address] 153 improved following bilateral sacroplasty few weeks ago. She is having some mechanical right hip pain. She had a bone density earlier this year through her rheumatologist at home. Not having much in the way of claudication symptomatology. Continue calcium with vitamin D. Patient was concerned about taking calcitonin spray due to side effect. She will discuss antiresorptive medications with her rheumatologist.  Referral to Physical Therapy  RF PRN Naproxen. Avoid lifting objects greater than 20 pounds. Follow-up and Dispositions    Return if symptoms worsen or fail to improve. HISTORY OF PRESENT ILLNESS      [place] is a 79 y.o. female presents for follow up of groin pain. LV trial of Gabapentin 300 mg BID, trial of Naproxen 500 mg BID PRN, given Toradol 30 mg IM injection. L MRI reviewed with patient. Noted to have bilateral sacral insufficiency fractures. Patient contacted and underwent bilateral sacroplasty 10/2022. Denies side effects. Does report benefit. Pt reports right groin pain, which is gradually improving. Her pain is increased with walking. She has intermittent numbness and tingling in her right anterior thigh when laying down. Denies recent falls. Denies insomnia.     She takes Naproxen as needed with benefit. She affirms mild heartburn. Pt denies any recent GI ulcers, bleeds or renal dysfunction. Pain Assessment  11/28/2022   Location of Pain Back   Location Modifiers -   Severity of Pain 3   Quality of Pain Aching;Locking; Sharp   Duration of Pain Persistent   Frequency of Pain Intermittent   Aggravating Factors Walking   Aggravating Factors Comment -   Limiting Behavior Yes   Relieving Factors Rest   Result of Injury No         Onset of pain: chronic, intermittent        Investigations:   L MRI 10/2022: acute sacral fx bilaterally, severe stenosis L4-5. Pt was contacted and referred to IR for sacroplasty evaluation. L XR AP/lat/flex/ext 4V 9/29/2022 (I personally reviewed these images): grade 1-2 listhesis L4-5, no instability, degenerative changes L3 to sacrum  Spine surgery consult: none     Treatments:  Physical therapy: 2017 for rotator cuff and low back  Spinal injections: no  Spinal surgery- no  Beneficial medications: Naproxen  Failed medications: Diclofenac, Prednisone, Tylenol, Zanaflex - takes edge off     Work Status: retired  Pertinent PMHx:  RA (diagnosed 2010), osteopenia. Pt is from [place], currently visiting family indefinitely, B/L sacroplasty 10/2022.      PHYSICAL EXAMINATION    Visit Vitals  /77 (BP 1 Location: Right arm, BP Patient Position: Sitting, BP Cuff Size: Adult)   Pulse 95   Temp 97.8 °F (36.6 °C) (Temporal)   Resp 18   Ht 5' 6\" (1.676 m)   Wt 184 lb 3.2 oz (83.6 kg)   SpO2 96% Comment: RA   BMI 29.73 kg/m²     Gait decreased weightbearing RLE, no foot drop  Right groin pain with hip flexion and IR  LE strength intact  SLR negative              Written by Latrice Martinez, as dictated by Jailyn Boyd MD.

## 2022-12-14 ENCOUNTER — APPOINTMENT (OUTPATIENT)
Dept: PHYSICAL THERAPY | Age: 67
End: 2022-12-14